# Patient Record
Sex: FEMALE | Race: WHITE | Employment: FULL TIME | URBAN - METROPOLITAN AREA
[De-identification: names, ages, dates, MRNs, and addresses within clinical notes are randomized per-mention and may not be internally consistent; named-entity substitution may affect disease eponyms.]

---

## 2020-08-15 ENCOUNTER — HOSPITAL ENCOUNTER (EMERGENCY)
Age: 45
Discharge: HOME | End: 2020-08-15
Attending: EMERGENCY MEDICINE
Payer: COMMERCIAL

## 2020-08-15 VITALS
SYSTOLIC BLOOD PRESSURE: 93 MMHG | RESPIRATION RATE: 17 BRPM | TEMPERATURE: 97.4 F | OXYGEN SATURATION: 100 % | WEIGHT: 179.23 LBS | BODY MASS INDEX: 28.81 KG/M2 | DIASTOLIC BLOOD PRESSURE: 58 MMHG | HEART RATE: 55 BPM | HEIGHT: 66 IN

## 2020-08-15 LAB
A/G RATIO: 1.8 (ref 1.1–2.2)
ALBUMIN SERPL-MCNC: 4 G/DL (ref 3.4–5)
ALP BLD-CCNC: 50 U/L (ref 40–129)
ALT SERPL-CCNC: 9 U/L (ref 10–40)
ANION GAP SERPL CALCULATED.3IONS-SCNC: 8 MMOL/L (ref 3–16)
AST SERPL-CCNC: 14 U/L (ref 15–37)
BASOPHILS ABSOLUTE: 0 K/UL (ref 0–0.2)
BASOPHILS RELATIVE PERCENT: 0.5 %
BILIRUB SERPL-MCNC: 0.3 MG/DL (ref 0–1)
BUN BLDV-MCNC: 19 MG/DL (ref 7–20)
CALCIUM SERPL-MCNC: 8.4 MG/DL (ref 8.3–10.6)
CHLORIDE BLD-SCNC: 106 MMOL/L (ref 99–110)
CO2: 24 MMOL/L (ref 21–32)
CREAT SERPL-MCNC: 0.8 MG/DL (ref 0.6–1.1)
EKG ATRIAL RATE: 59 BPM
EKG DIAGNOSIS: NORMAL
EKG P AXIS: 52 DEGREES
EKG P-R INTERVAL: 164 MS
EKG Q-T INTERVAL: 452 MS
EKG QRS DURATION: 110 MS
EKG QTC CALCULATION (BAZETT): 447 MS
EKG R AXIS: 64 DEGREES
EKG T AXIS: 47 DEGREES
EKG VENTRICULAR RATE: 59 BPM
EOSINOPHILS ABSOLUTE: 0.1 K/UL (ref 0–0.6)
EOSINOPHILS RELATIVE PERCENT: 0.9 %
GFR AFRICAN AMERICAN: >60
GFR NON-AFRICAN AMERICAN: >60
GLOBULIN: 2.2 G/DL
GLUCOSE BLD-MCNC: 106 MG/DL (ref 70–99)
HCT VFR BLD CALC: 37.7 % (ref 36–48)
HEMOGLOBIN: 12.3 G/DL (ref 12–16)
LYMPHOCYTES ABSOLUTE: 2.4 K/UL (ref 1–5.1)
LYMPHOCYTES RELATIVE PERCENT: 26 %
MCH RBC QN AUTO: 28.8 PG (ref 26–34)
MCHC RBC AUTO-ENTMCNC: 32.5 G/DL (ref 31–36)
MCV RBC AUTO: 88.6 FL (ref 80–100)
MONOCYTES ABSOLUTE: 0.5 K/UL (ref 0–1.3)
MONOCYTES RELATIVE PERCENT: 5.8 %
NEUTROPHILS ABSOLUTE: 6.2 K/UL (ref 1.7–7.7)
NEUTROPHILS RELATIVE PERCENT: 66.8 %
PDW BLD-RTO: 12.2 % (ref 12.4–15.4)
PLATELET # BLD: 188 K/UL (ref 135–450)
PMV BLD AUTO: 9 FL (ref 5–10.5)
POTASSIUM SERPL-SCNC: 3.9 MMOL/L (ref 3.5–5.1)
RBC # BLD: 4.26 M/UL (ref 4–5.2)
SODIUM BLD-SCNC: 138 MMOL/L (ref 136–145)
TOTAL PROTEIN: 6.2 G/DL (ref 6.4–8.2)
TROPONIN: <0.01 NG/ML
WBC # BLD: 9.2 K/UL (ref 4–11)

## 2020-08-15 PROCEDURE — 80053 COMPREHEN METABOLIC PANEL: CPT

## 2020-08-15 PROCEDURE — 99284 EMERGENCY DEPT VISIT MOD MDM: CPT

## 2020-08-15 PROCEDURE — 93010 ELECTROCARDIOGRAM REPORT: CPT | Performed by: INTERNAL MEDICINE

## 2020-08-15 PROCEDURE — 2580000003 HC RX 258: Performed by: EMERGENCY MEDICINE

## 2020-08-15 PROCEDURE — 84484 ASSAY OF TROPONIN QUANT: CPT

## 2020-08-15 PROCEDURE — 85025 COMPLETE CBC W/AUTO DIFF WBC: CPT

## 2020-08-15 PROCEDURE — 36415 COLL VENOUS BLD VENIPUNCTURE: CPT

## 2020-08-15 PROCEDURE — 93005 ELECTROCARDIOGRAM TRACING: CPT | Performed by: EMERGENCY MEDICINE

## 2020-08-15 RX ORDER — BUPROPION HYDROCHLORIDE 150 MG/1
150 TABLET, EXTENDED RELEASE ORAL 2 TIMES DAILY
COMMUNITY

## 2020-08-15 RX ORDER — 0.9 % SODIUM CHLORIDE 0.9 %
1500 INTRAVENOUS SOLUTION INTRAVENOUS ONCE
Status: COMPLETED | OUTPATIENT
Start: 2020-08-15 | End: 2020-08-15

## 2020-08-15 RX ORDER — ARIPIPRAZOLE 5 MG/1
5 TABLET ORAL DAILY
COMMUNITY
End: 2020-09-09

## 2020-08-15 RX ADMIN — SODIUM CHLORIDE 1500 ML: 9 INJECTION, SOLUTION INTRAVENOUS at 13:19

## 2020-08-15 NOTE — ED NOTES
Pt was brought in by [facility] with c/o lightheadedness and \"seeing spots\". Pt states that she was outside doing some work and after about 15 minutes she stood up she got lightheaded and saw spot. Pt states that she went in the house to sit down and then she got up and the same thing happened. EMT reported pt's blood pressure at 82/50 upon arrival at her home. They started an IV in her left AC and gave IV fluids that were still infusing when she arrived here. Pt is alert and oriented x 4, denies pain or shortness of breath. Pts significant other is at the bedside.       [address] Naty RN  08/15/20 [facility] [place] 8034, 0257 [facility]  08/15/20 2978

## 2020-08-15 NOTE — ED PROVIDER NOTES
with sitting down. No vertigo. No headache. No extremity weakness numbness or tingling. Except as noted above the remainder of the review of systems was reviewed and negative. PAST MEDICAL HISTORY   History reviewed. No pertinent past medical history. SURGICAL HISTORY     History reviewed. No pertinent surgical history. CURRENT MEDICATIONS       Previous Medications    ARIPIPRAZOLE (ABILIFY) 5 MG TABLET    Take 5 mg by mouth daily    BUPROPION (WELLBUTRIN SR) 150 MG EXTENDED RELEASE TABLET    Take 150 mg by mouth 2 times daily       ALLERGIES     Patient has no known allergies. FAMILY HISTORY     History reviewed. No pertinent family history.        SOCIAL HISTORY       Social History     Socioeconomic History    Marital status: Legally      Spouse name: None    Number of children: None    Years of education: None    Highest education level: None   Occupational History    None   Social Needs    Financial resource strain: None    Food insecurity     Worry: None     Inability: None    Transportation needs     Medical: None     Non-medical: None   Tobacco Use    Smoking status: Current Every Day Smoker     Packs/day: 0.50    Smokeless tobacco: Never Used   Substance and Sexual Activity    Alcohol use: No    Drug use: No    Sexual activity: None   Lifestyle    Physical activity     Days per week: None     Minutes per session: None    Stress: None   Relationships    Social connections     Talks on phone: None     Gets together: None     Attends [employer] service: None     Active member of club or organization: None     Attends meetings of clubs or organizations: None     Relationship status: None    Intimate partner violence     Fear of current or ex partner: None     Emotionally abused: None     Physically abused: None     Forced sexual activity: None   Other Topics Concern    None   Social History Narrative    None         PHYSICAL EXAM    (up to 7 for level 4, 8 or more for level 5)     ED Triage Vitals [08/15/20 1257]   BP Temp Temp Source Pulse Resp SpO2 Height Weight   (!) 92/51 97.4 °F (36.3 °C) Oral 69 16 94 % 5' 6\" (1.676 m) 179 lb 3.7 oz (81.3 kg)       General: Alert white female no acute distress. Head: Atraumatic and normocephalic. Eyes: No conjunctival injection. Pupils equal round reactive. Extraocular movements are intact. ENT: TMs are normal.  Nose clear. Oropharynx moist without erythema. Neck: Supple without adenopathy, nontender. Heart: Regular rate and rhythm. No murmurs or gallops noted. Lungs: Breath sounds equal bilaterally and clear. Abdomen: Soft, nondistended, nontender. No masses organomegaly. Skin: Warm and dry, good turgor. No pallor or cyanosis. Neuro: Awake, alert, oriented. Symmetrical reactive pupils. Intact extraocular movements. No facial asymmetry. Nose nystagmus. Negative test of skew. Symmetrical motor function. No drift. No limb ataxia. Normal gait. DIFFERENTIAL DIAGNOSIS   Differential includes but is not limited to anemia, dehydration, heat exhaustion, TIA, stroke, hypoglycemia, tachyarrhythmia, bradycardia arrhythmia      DIAGNOSTIC RESULTS     EKG: All EKG's are interpreted by Teresa Bee MD in the absence of a cardiologist.    Sinus bradycardia, rate of 59, no acute ST-T wave changes. Rhythm strip shows sinus bradycardia with a rate of 59, SC interval 164 ms,  ms with no other ectopy as interpreted by me. No old EKG available for comparison.     RADIOLOGY:   Non-plain film images such as CT, Ultrasound and MRI are read by the radiologist. Plain radiographic images are visualized and preliminarily interpreted Teresa Bee MD with the below findings:      Interpretation per the Radiologist below, if available at the time of this note:    No orders to display         ED BEDSIDE ULTRASOUND:   Performed by ED Physician - none    LABS:  Labs Reviewed   CBC WITH AUTO DIFFERENTIAL - hypotension, likely related to heat exposure, not eating much at all today could have been a contributing factor to her lightheadedness and dizziness although her blood sugar was not significantly decreased here. We hydrated her and gave her a p.o. diet. Her blood pressure was in the 90s. She felt much better. I think her symptoms are related to her low blood pressure likely related to heat exposure. I encouraged her to stay indoors in a cool environment, maintain an adequate diet, encourage oral hydration. She was given a primary care referral for follow-up. Test results, diagnosis, and treatment plan were discussed with the patient. She understands the treatment plan and follow-up as discussed. CONSULTS:  None    PROCEDURES:  None    FINAL IMPRESSION      1.  Orthostatic hypotension          DISPOSITION/PLAN   DISPOSITION Decision To Discharge 08/15/2020 02:40:37 PM      PATIENT REFERRED TO:  [facility]  [phone]  In 3 days        DISCHARGE MEDICATIONS:  New Prescriptions    No medications on file       (Please note that portions of this note were completed with a voice recognition program.  Efforts were made to edit the dictations but occasionally words are mis-transcribed.)    Sudarshan Lee MD  Attending Emergency Physician        Yari Liz MD  08/15/20 1644

## 2020-09-09 ENCOUNTER — OFFICE VISIT (OUTPATIENT)
Dept: CARDIOLOGY CLINIC | Age: 45
End: 2020-09-09
Payer: COMMERCIAL

## 2020-09-09 VITALS
HEART RATE: 65 BPM | BODY MASS INDEX: 28.12 KG/M2 | RESPIRATION RATE: 18 BRPM | SYSTOLIC BLOOD PRESSURE: 126 MMHG | OXYGEN SATURATION: 99 % | HEIGHT: 66 IN | DIASTOLIC BLOOD PRESSURE: 64 MMHG | WEIGHT: 175 LBS

## 2020-09-09 PROBLEM — R55 VASOVAGAL SYNCOPE: Status: ACTIVE | Noted: 2020-09-09

## 2020-09-09 PROBLEM — I95.1 ORTHOSTATIC HYPOTENSION: Status: ACTIVE | Noted: 2020-09-09

## 2020-09-09 PROCEDURE — 99203 OFFICE O/P NEW LOW 30 MIN: CPT | Performed by: INTERNAL MEDICINE

## 2020-09-09 RX ORDER — NICOTINE 21 MG/24HR
PATCH, TRANSDERMAL 24 HOURS TRANSDERMAL
COMMUNITY

## 2020-09-09 RX ORDER — LORAZEPAM 0.5 MG/1
TABLET ORAL
COMMUNITY

## 2020-09-09 RX ORDER — ERGOCALCIFEROL 1.25 MG/1
CAPSULE ORAL
COMMUNITY
Start: 2020-07-31

## 2020-09-09 ASSESSMENT — ENCOUNTER SYMPTOMS
CHEST TIGHTNESS: 0
PHOTOPHOBIA: 0
BLOOD IN STOOL: 0
COUGH: 0
SHORTNESS OF BREATH: 0
ABDOMINAL DISTENTION: 0

## 2020-09-09 NOTE — PATIENT INSTRUCTIONS
1. Recheck lipid panel ~Feb 2021.  2. Stay well hydrated. 3. Call office if anymore episodes of dizziness or passing out.

## 2020-09-09 NOTE — PROGRESS NOTES
126/64   Pulse: 65   Resp: 18   SpO2: 99%    Weight: 175 lb (79.4 kg)     Gen Alert, cooperative, no distress Heart  Regular rate and rhythm, no murmur. No jvd. No carotid hypersensitivity. Negative adson's test. No subclavian bruit   Head Normocephalic, atraumatic, no abnormalities Abd  Soft, NT, +BS, no mass, no OM   Eyes PERRLA, conj/corn clear Ext  Ext nl, AT, no C/C, no edema   Nose Nares normal, no drain age, Non-tender Pulse 2+ and symmetric   Throat Lips, mucosa, tongue normal Skin Color/text/turg nl, no rash/lesions   Neck S/S, TM, NT, no bruit Psych Nl mood and affect   Lung Clear bilaterally, no wheezes     Ch wall NT, no deform       LABS and Imaging     Relevant and available CV data reviewed  Echo/MRI: None   Cath: None  Holter:None  EKG 8/15/2020: SB 59, qtc 447. No evidence of arvc, hcm, prolonged qtc, LVH on ekg  Stress:None    EKG personally reviewed  Labs and records reviewed  Moderate risk  Low complexity     ASSESSMENT AND PLAN     1. Vasovagal/heat syncope, stable  - Likely related to working outside in the heat without adequate oral intake. Prodromal nausea. Patient was hypotensive in ER, improved with fluids. Normal physical exam and ekg. Less likely considerations include arrhythmia or subclavian steal   Plan:  - patient encouraged to stay hydrated and educated to avoid excessive heat  - continue to monitor symptoms and follow up closely with office    2. Tobacco use, improving  Plan:  Smoked 1/2ppd, down to a couple per day. Trying to quit, wears Nicoderm patch. Counseled on smoking cessation    3. Elevated cholesterol level: Lipid panel 2/10/2020> , TG 98, HDL 48,  -- not on statin. Plan:   - recommend diet and exercise for 6 months  - repeat lipid panel    Follow Up: 1 month    Dr. Makeda Suggs attestation: This note was scribed in the presence of Dr. Fredis Brooks MD, by Thelma Simmonds, RN.

## 2023-02-08 ENCOUNTER — HOSPITAL ENCOUNTER (EMERGENCY)
Age: 48
Discharge: HOME | End: 2023-02-08
Attending: EMERGENCY MEDICINE
Payer: COMMERCIAL

## 2023-02-08 ENCOUNTER — APPOINTMENT (OUTPATIENT)
Dept: GENERAL RADIOLOGY | Age: 48
End: 2023-02-08
Payer: COMMERCIAL

## 2023-02-08 VITALS
HEIGHT: 66 IN | HEART RATE: 57 BPM | TEMPERATURE: 98.3 F | OXYGEN SATURATION: 100 % | DIASTOLIC BLOOD PRESSURE: 56 MMHG | BODY MASS INDEX: 28.95 KG/M2 | WEIGHT: 180.12 LBS | RESPIRATION RATE: 18 BRPM | SYSTOLIC BLOOD PRESSURE: 111 MMHG

## 2023-02-08 DIAGNOSIS — S29.019A ACUTE THORACIC MYOFASCIAL STRAIN, INITIAL ENCOUNTER: Primary | ICD-10-CM

## 2023-02-08 LAB
A/G RATIO: 1.5 (ref 1.1–2.2)
ALBUMIN SERPL-MCNC: 4.1 G/DL (ref 3.4–5)
ALP BLD-CCNC: 70 U/L (ref 40–129)
ALT SERPL-CCNC: 8 U/L (ref 10–40)
ANION GAP SERPL CALCULATED.3IONS-SCNC: 8 MMOL/L (ref 3–16)
AST SERPL-CCNC: 13 U/L (ref 15–37)
BASOPHILS ABSOLUTE: 0 K/UL (ref 0–0.2)
BASOPHILS RELATIVE PERCENT: 0.4 %
BILIRUB SERPL-MCNC: <0.2 MG/DL (ref 0–1)
BILIRUBIN URINE: NEGATIVE
BLOOD, URINE: ABNORMAL
BUN BLDV-MCNC: 18 MG/DL (ref 7–20)
CALCIUM SERPL-MCNC: 8.9 MG/DL (ref 8.3–10.6)
CHLORIDE BLD-SCNC: 107 MMOL/L (ref 99–110)
CLARITY: CLEAR
CO2: 25 MMOL/L (ref 21–32)
COLOR: YELLOW
CREAT SERPL-MCNC: 0.5 MG/DL (ref 0.6–1.1)
D DIMER: 0.38 UG/ML FEU (ref 0–0.6)
EOSINOPHILS ABSOLUTE: 0.1 K/UL (ref 0–0.6)
EOSINOPHILS RELATIVE PERCENT: 0.9 %
EPITHELIAL CELLS, UA: ABNORMAL /HPF (ref 0–5)
GFR SERPL CREATININE-BSD FRML MDRD: >60 ML/MIN/{1.73_M2}
GLUCOSE BLD-MCNC: 88 MG/DL (ref 70–99)
GLUCOSE URINE: NEGATIVE MG/DL
HCG(URINE) PREGNANCY TEST: NEGATIVE
HCT VFR BLD CALC: 38.4 % (ref 36–48)
HEMOGLOBIN: 12.8 G/DL (ref 12–16)
IMMATURE GRANULOCYTES #: 0 K/UL (ref 0–0.2)
IMMATURE GRANULOCYTES %: 0.1 %
KETONES, URINE: NEGATIVE MG/DL
LEUKOCYTE ESTERASE, URINE: NEGATIVE
LYMPHOCYTES ABSOLUTE: 2.2 K/UL (ref 1–5.1)
LYMPHOCYTES RELATIVE PERCENT: 21.2 %
MCH RBC QN AUTO: 29.2 PG (ref 26–34)
MCHC RBC AUTO-ENTMCNC: 33.3 G/DL (ref 32–36.4)
MCV RBC AUTO: 87.5 FL (ref 80–100)
MICROSCOPIC EXAMINATION: YES
MONOCYTES ABSOLUTE: 0.5 K/UL (ref 0–1.3)
MONOCYTES RELATIVE PERCENT: 5 %
MUCUS: ABNORMAL /LPF
NEUTROPHILS ABSOLUTE: 7.7 K/UL (ref 1.7–7.7)
NEUTROPHILS RELATIVE PERCENT: 72.4 %
NITRITE, URINE: NEGATIVE
PDW BLD-RTO: 13.3 % (ref 12.4–15.4)
PH UA: 6.5 (ref 5–8)
PLATELET # BLD: 242 K/UL (ref 135–450)
PMV BLD AUTO: 10.7 FL (ref 5–10.5)
POTASSIUM SERPL-SCNC: 4.1 MMOL/L (ref 3.5–5.1)
PROTEIN UA: NEGATIVE MG/DL
RBC # BLD: 4.39 M/UL (ref 4–5.2)
RBC UA: ABNORMAL /HPF (ref 0–4)
SODIUM BLD-SCNC: 140 MMOL/L (ref 136–145)
SPECIFIC GRAVITY UA: 1.02 (ref 1–1.03)
TOTAL PROTEIN: 6.8 G/DL (ref 6.4–8.2)
URINE REFLEX TO CULTURE: ABNORMAL
URINE TYPE: ABNORMAL
UROBILINOGEN, URINE: 0.2 E.U./DL
WBC # BLD: 10.6 K/UL (ref 4–11)
WBC UA: ABNORMAL /HPF (ref 0–5)

## 2023-02-08 PROCEDURE — 80053 COMPREHEN METABOLIC PANEL: CPT

## 2023-02-08 PROCEDURE — 85379 FIBRIN DEGRADATION QUANT: CPT

## 2023-02-08 PROCEDURE — 99284 EMERGENCY DEPT VISIT MOD MDM: CPT

## 2023-02-08 PROCEDURE — 84703 CHORIONIC GONADOTROPIN ASSAY: CPT

## 2023-02-08 PROCEDURE — 81001 URINALYSIS AUTO W/SCOPE: CPT

## 2023-02-08 PROCEDURE — 85025 COMPLETE CBC W/AUTO DIFF WBC: CPT

## 2023-02-08 PROCEDURE — 36415 COLL VENOUS BLD VENIPUNCTURE: CPT

## 2023-02-08 PROCEDURE — 71046 X-RAY EXAM CHEST 2 VIEWS: CPT

## 2023-02-08 RX ORDER — POTASSIUM CHLORIDE 20 MEQ/1
TABLET, EXTENDED RELEASE ORAL
COMMUNITY

## 2023-02-08 RX ORDER — FUROSEMIDE 20 MG/1
TABLET ORAL
COMMUNITY

## 2023-02-08 RX ORDER — IBUPROFEN 600 MG/1
600 TABLET ORAL EVERY 8 HOURS PRN
Qty: 30 TABLET | Refills: 0 | Status: SHIPPED | OUTPATIENT
Start: 2023-02-08

## 2023-02-08 RX ORDER — METHOCARBAMOL 750 MG/1
750 TABLET, FILM COATED ORAL 3 TIMES DAILY PRN
Qty: 30 TABLET | Refills: 0 | Status: SHIPPED | OUTPATIENT
Start: 2023-02-08 | End: 2023-02-18

## 2023-02-08 ASSESSMENT — PAIN SCALES - GENERAL: PAINLEVEL_OUTOF10: 8

## 2023-02-08 ASSESSMENT — PAIN DESCRIPTION - LOCATION
LOCATION: BACK
LOCATION: BACK

## 2023-02-08 ASSESSMENT — PAIN - FUNCTIONAL ASSESSMENT
PAIN_FUNCTIONAL_ASSESSMENT: 0-10
PAIN_FUNCTIONAL_ASSESSMENT: 0-10

## 2023-02-08 ASSESSMENT — PAIN DESCRIPTION - DESCRIPTORS: DESCRIPTORS: ACHING

## 2023-02-08 NOTE — ED PROVIDER NOTES
[address] [facility]  eMERGENCY dEPARTMENT eNCOUnter      Pt Name: Ivania Alvarez  MRN: [id number]  [place] 1975  Date of evaluation: 2/8/2023  Provider: Ann Marie Del Angel MD    [address] [place] [address]       Chief Complaint   Patient presents with    Back Pain     Left mid back by rib cage. Started this morning when woke up. Patient stated pain continues to get worse. Increase pain with movement and deep inspiration. CRITICAL CARE TIME   Total Critical Care time was  minutes, excluding separately reportable procedures. There was a high probability of clinically significant/life threatening deterioration in the patient's condition which required my urgent intervention. HISTORY OF PRESENT ILLNESS  (Location/Symptom, Timing/Onset, Context/Setting, Quality, Duration, Modifying Factors, Severity.)   History From: Patient  Limitations to history : None    Ivania Alvarez is a 56 y.o. female who presents to the emergency department complaining of some left mid back pain. The pain is somewhat sharp, she mainly notices it with inspiration. No history of any injury or trauma. She denies any recent illness. No cough. No shortness of breath. No abdominal pain nausea or vomiting. No frequency urgency or dysuria. She denies leg pain or leg swelling. No recent travel or immobilization. Nursing Notes were reviewed and I agree. SCREENINGS        Silvia Coma Scale  Eye Opening: Spontaneous  Best Verbal Response: Oriented  Best Motor Response: Obeys commands  Central Coma Scale Score: 15                CIWA Assessment  BP: 109/70  Heart Rate: 71           REVIEW OF SYSTEMS    (2-9 systems for level 4, 10 or more for level 5)     General: No fever or chills. HEENT: No earache rhinorrhea or sore throat. Cardiovascular: No chest pain. Pulmonary: No shortness of breath or cough. GI: No abdominal pain nausea vomiting. : No frequency urgency or dysuria.   Musculoskeletal: Left lower thoracic back pain, worse with inspiration. No leg pain or leg swelling. Skin: No rash. Except as noted above the remainder of the review of systems was reviewed and negative. PAST MEDICAL HISTORY     Past Medical History:   Diagnosis Date    Depression     Hyperlipidemia     Syncope and collapse          SURGICAL HISTORY     History reviewed. No pertinent surgical history. CURRENT MEDICATIONS       Previous Medications    BUPROPION (WELLBUTRIN SR) 150 MG EXTENDED RELEASE TABLET    Take 150 mg by mouth 2 times daily    FUROSEMIDE (LASIX) 20 MG TABLET    furosemide 20 mg tablet   TAKE 1 TABLET BY MOUTH EVERY DAY AS NEEDED    LORAZEPAM (ATIVAN) 0.5 MG TABLET    lorazepam 0.5 mg tablet   Take 1 tablet every day by oral route. NICOTINE (NICODERM CQ) 14 MG/24HR    Nicoderm CQ 14 mg/24 hr daily transdermal patch   Apply 1 patch every day by transdermal route. POTASSIUM CHLORIDE (KLOR-CON M) 20 MEQ EXTENDED RELEASE TABLET    potassium chloride ER 20 mEq tablet,extended release   TAKE 1 TABLET BY MOUTH EVERY DAY    VITAMIN D (ERGOCALCIFEROL) 1.25 MG (49185 UT) CAPS CAPSULE    TAKE 1 CAPSULE BY MOUTH ONE TIME A WEEK       ALLERGIES     Patient has no known allergies.     FAMILY HISTORY       Family History   Problem Relation Age of Onset    Cancer Maternal Grandmother     Cancer Maternal Grandfather           SOCIAL HISTORY       Social History     Socioeconomic History    Marital status:      Spouse name: None    Number of children: None    Years of education: None    Highest education level: None   Tobacco Use    Smoking status: Every Day     Packs/day: 0.50     Types: Cigarettes    Smokeless tobacco: Never   Substance and Sexual Activity    Alcohol use: No    Drug use: No         PHYSICAL EXAM    (up to 7 for level 4, 8 or more for level 5)     ED Triage Vitals [02/08/23 1228]   BP Temp Temp Source Heart Rate Resp SpO2 Height Weight   109/70 98.3 °F (36.8 °C) Tympanic 71 18 99 % 5' 6\" (1.676 m) 180 lb 1.9 oz (81.7 kg)       General: Alert white female in no acute distress. Head: Atraumatic and normocephalic. Eyes: No conjunctival injection. No pallor. Pupils equal round reactive. ENT: Jerod Chock is clear. Oropharynx moist without erythema. Neck: Supple, nontender, no adenopathy. Heart: Regular rate and rhythm. No murmurs gallops noted. Lungs: Breath sounds equal bilaterally and clear. Abdomen: Soft, nondistended, nontender. No mass organomegaly. Bowel sounds are normal.  Musculoskeletal: There is some minimal tenderness in her left lower thoracic paraspinous area. There is no lower extremity edema or calf tenderness. Intact symmetrical distal pulses. Skin: Warm and dry, good turgor, no rash. DIFFERENTIAL DIAGNOSIS   Differential includes but is not limited to thoracic strain, pulmonary embolism, kidney stone, pyelonephritis, pneumothorax, other. DIAGNOSTIC RESULTS     EKG: All EKG's are interpreted by Silver Fair MD in the absence of a cardiologist.      RADIOLOGY:   Non-plain film images such as CT, Ultrasound and MRI are read by the radiologist. Plain radiographic images are visualized and preliminarily interpreted Silver Fair MD with the below findings:        Interpretation per the Radiologist below, if available at the time of this note:    XR CHEST (2 VW)   Final Result   No acute process.                ED BEDSIDE ULTRASOUND:   Performed by ED Physician - none    LABS:  Labs Reviewed   CBC WITH AUTO DIFFERENTIAL - Abnormal; Notable for the following components:       Result Value    MPV 10.7 (*)     All other components within normal limits   COMPREHENSIVE METABOLIC PANEL - Abnormal; Notable for the following components:    Creatinine 0.5 (*)     ALT 8 (*)     AST 13 (*)     All other components within normal limits   URINALYSIS WITH REFLEX TO CULTURE - Abnormal; Notable for the following components:    Blood, Urine TRACE-INTACT (*)     All other components within normal limits   D-DIMER, QUANTITATIVE   PREGNANCY, URINE   MICROSCOPIC URINALYSIS       All other labs were within normal range or not returned as of this dictation. EMERGENCY DEPARTMENT COURSE and DIFFERENTIAL DIAGNOSIS/MDM:   Vitals:    Vitals:    02/08/23 1228   BP: 109/70   Pulse: 71   Resp: 18   Temp: 98.3 °F (36.8 °C)   TempSrc: Tympanic   SpO2: 99%   Weight: 180 lb 1.9 oz (81.7 kg)   Height: 5' 6\" (1.676 m)       Patient was given the following medications:  Medications - No data to display          Is this patient to be included in the SEP-1 Core Measure due to severe sepsis or septic shock? No   Exclusion criteria - the patient is NOT to be included for SEP-1 Core Measure due to: Infection is not suspected    Chronic Conditions affecting care: Below   has a past medical history of Depression, Hyperlipidemia, and Syncope and collapse. CONSULTS: (Who and What was discussed)  None      Social Determinants : None    Records Reviewed (Source): Epic / PMH / Medications    CC/HPI Summary, DDx, ED Course, and Reassessment: This patient presents with back pain as above. Her pain is in her lower thoracic paraspinous area. It is worse with movement and inspiration. She is not short of breath. She is not tachycardic. She is not hypoxic. She has no leg pain or leg swelling. She has no chest or abdominal pain. She has no acute cardiopulmonary findings on her chest x-ray. Her D-dimer is normal.  I have a low index of suspicion for pulmonary embolism. She has no pyuria or hematuria to suggest kidney stone or infection. I think her pain is muscular in origin. She will be put on ibuprofen and Robaxin. Follow-up with her primary care provider in 5 to 7 days if not improved. Return as needed for worsening of symptoms or new symptoms of concern. Test results, diagnosis, and treatment plan were discussed with the patient.   She understands the treatment plan follow-up as discussed. Disposition Considerations (tests considered but not done, Admit vs D/C, Shared Decision Making, Pt Expectation of Test or Tx.): Outpatient follow-up if symptoms do not resolve. Return for any worsening of symptoms or new symptoms of concern. I do not think pulmonary embolism study is indicated. No tachycardia. No hypoxia. Negative D-dimer. I am the Primary Clinician of Record. PROCEDURES:  None    FINAL IMPRESSION      1.  Acute thoracic myofascial strain, initial encounter          DISPOSITION/PLAN   DISPOSITION Decision To Discharge 02/08/2023 03:05:25 PM      PATIENT REFERRED TO:  Graeme Jackman, [place] [address]  [phone]    Schedule an appointment as soon as possible for a visit in 1 week  If not resolved    DISCHARGE MEDICATIONS:  New Prescriptions    IBUPROFEN (ADVIL;MOTRIN) 600 MG TABLET    Take 1 tablet by mouth every 8 hours as needed for Pain    METHOCARBAMOL (ROBAXIN-750) 750 MG TABLET    Take 1 tablet by mouth 3 times daily as needed (pain)       (Please note that portions of this note were completed with a voice recognition program.  Efforts were made to edit the dictations but occasionally words are mis-transcribed.)    Marika Garcia MD  Attending Emergency Physician        Tim Fonseca MD  02/08/23 8697

## 2023-02-08 NOTE — ED TRIAGE NOTES
Patient came to ER with complaints of left mid back pain. Patient stated pain started this morning when waking up. Pain increases with deep breaths and movement.

## 2023-02-08 NOTE — DISCHARGE INSTRUCTIONS
Use ice packs or cold compresses to the painful area every 2-3 hours. Ibuprofen Robaxin as prescribed for pain. Follow-up in 5 to 7 days if not improved. Return at anytime for worsening of symptoms or new symptoms of concern.

## 2023-02-08 NOTE — ED NOTES
Discharge instructions reviewed. Patient verbalized understanding. Prescription x2 sent to pharmacy.        Herbert Marie RN  02/08/23 0187

## 2023-04-19 LAB
BASOPHILS # BLD: 0.1 K/UL (ref 0–0.2)
BASOPHILS NFR BLD: 0.7 %
CRP SERPL-MCNC: <3 MG/L (ref 0–5.1)
DEPRECATED RDW RBC AUTO: 13.4 % (ref 12.4–15.4)
EOSINOPHIL # BLD: 0 K/UL (ref 0–0.6)
EOSINOPHIL NFR BLD: 0.6 %
HCT VFR BLD AUTO: 39.1 % (ref 36–48)
HGB BLD-MCNC: 13.3 G/DL (ref 12–16)
IGA SERPL-MCNC: 178 MG/DL (ref 70–400)
IGG SERPL-MCNC: 959 MG/DL (ref 700–1600)
IGM SERPL-MCNC: 99 MG/DL (ref 40–230)
IRON SATN MFR SERPL: 27 % (ref 15–50)
IRON SERPL-MCNC: 85 UG/DL (ref 37–145)
LYMPHOCYTES # BLD: 2.3 K/UL (ref 1–5.1)
LYMPHOCYTES NFR BLD: 30.2 %
MCH RBC QN AUTO: 29.8 PG (ref 26–34)
MCHC RBC AUTO-ENTMCNC: 34 G/DL (ref 31–36)
MCV RBC AUTO: 87.6 FL (ref 80–100)
MONOCYTES # BLD: 0.4 K/UL (ref 0–1.3)
MONOCYTES NFR BLD: 5.7 %
NEUTROPHILS # BLD: 4.7 K/UL (ref 1.7–7.7)
NEUTROPHILS NFR BLD: 62.8 %
PLATELET # BLD AUTO: 243 K/UL (ref 135–450)
PMV BLD AUTO: 10.5 FL (ref 5–10.5)
RBC # BLD AUTO: 4.47 M/UL (ref 4–5.2)
TIBC SERPL-MCNC: 314 UG/DL (ref 260–445)
WBC # BLD AUTO: 7.5 K/UL (ref 4–11)

## 2023-04-20 LAB — TISSUE TRANSGLUTAMINASE IGA: <0.5 U/ML (ref 0–14)
